# Patient Record
(demographics unavailable — no encounter records)

---

## 2025-01-30 NOTE — PHYSICAL EXAM
[General Appearance - Well Nourished] : well nourished [Sclera] : the sclera and conjunctiva were normal [Outer Ear] : the ears and nose were normal in appearance [Neck Appearance] : the appearance of the neck was normal [Auscultation Breath Sounds / Voice Sounds] : lungs were clear to auscultation bilaterally [Murmurs] : no murmurs [FreeTextEntry1] : tachycardia [Edema] : there was no peripheral edema [Abdomen Soft] : soft [Cervical Lymph Nodes Enlarged Posterior Bilaterally] : posterior cervical [Cervical Lymph Nodes Enlarged Anterior Bilaterally] : anterior cervical [Supraclavicular Lymph Nodes Enlarged Bilaterally] : supraclavicular [Musculoskeletal - Swelling] : no joint swelling seen [Skin Color & Pigmentation] : normal skin color and pigmentation [Skin Turgor] : normal skin turgor [] : no rash [Sensation] : the sensory exam was normal to light touch and pinprick [Motor Exam] : the motor exam was normal [Oriented To Time, Place, And Person] : oriented to person, place, and time [Impaired Insight] : insight and judgment were intact [Affect] : the affect was normal

## 2025-01-30 NOTE — HISTORY OF PRESENT ILLNESS
[FreeTextEntry1] : 1. SLE/LN   Pt was diagnosed with SLE 2018. She likely had PRES (symptoms of seizures, confusion, HTN) at the time. Renal failure required HD. A renal bx done 5/2018 showed class V LN with moderate interstitial fibrosis and tubular atrophy. Serologies at the time were notable for: BRIDGETTE 1:1280, positive RNP and Miller, negative Sjogren's and dsDNA.  She was previously treated with Cellcept, but discontinued due to leukopenia and diarrhea. Received Rituxan 6/2018 and 7/2018. 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 Serum phospholipase A2 receptor Ab was negative. Pt received a kidney transplant in 2019; however, pt showed signs of rejection 5/2020 with high donor specific antibodies-5/2020 renal biopsy showed acute cellular tubulointerstitial allograft rejection. She received PLEX and bortezomib, and repeat bx 11/2020 showed global glomerulosclerosis; however, it was a limited sample with only 6 glomeruli. In 2023, pt officially had failed transplant due to Covid. She started HD and then in 2024 switched to PD 2. Avascular necrosis: right knee AVN on MRI 2020  3. DVT and PE: Pt had DVT R leg 5/2018 and took Lovenox full dose for 3 months then prophylactic dose for 1 year.  In 2022, pt also had L lower leg DVT and b/l PE, requiring thrombectomy and TPA. APS serology was negative, but she had high factor 8 levels. She has been on Eliquis since 2022. 4. Peripheral neuropathy:  she was hospitalized in early 2025 for bilateral foot pain, which was felt to be from a sensory neuropathy. With gabapentin, the symptoms improved.  5. Hypokalemia  Meds: Envarsus 6 mg/d famotidine 20 mg/d prednisone 7.5 mg/d KCl 40 mEq 3xd folate B12 gabapentin 100 mg/d Eliquis 5 mg 2xd  Vaccines entered in EMR

## 2025-01-30 NOTE — ASSESSMENT
[FreeTextEntry1] : 1. SLE/LN   Pt was diagnosed with SLE 2018. She likely had PRES (symptoms of seizures, confusion, HTN) at the time. Renal failure required HD. A renal bx done 5/2018 showed class V LN with moderate interstitial fibrosis and tubular atrophy. Serologies at the time were notable for: BRIDGETTE 1:1280, positive RNP and Miller, negative Sjogren's and dsDNA.  She was previously treated with Cellcept, but discontinued due to leukopenia and diarrhea. Received Rituxan 6/2018 and 7/2018. 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 Serum phospholipase A2 receptor Ab was negative. Pt received a kidney transplant in 2019; however, pt showed signs of rejection 5/2020 with high donor specific antibodies-5/2020 renal biopsy showed acute cellular tubulointerstitial allograft rejection. She received PLEX and bortezomib, and repeat bx 11/2020 showed global glomerulosclerosis; however, it was a limited sample with only 6 glomeruli. In 2023, pt officially had failed transplant due to Covid. She started HD and then in 2024 switched to PD 2. Avascular necrosis: right knee AVN on MRI 2020  3. DVT and PE: Pt had DVT R leg 5/2018 and took Lovenox full dose for 3 months then prophylactic dose for 1 year.  In 2022, pt also had L lower leg DVT and b/l PE, requiring thrombectomy and TPA. APS serology was negative, but she had high factor 8 levels. She has been on Eliquis since 2022. 4. Peripheral neuropathy:  she was hospitalized in early 2025 for bilateral foot pain, which was felt to be from a sensory neuropathy. With gabapentin, the symptoms improved.  5. Hypokalemia  Plan Lab tests Reviewed internal and/or external records of other providers Contact me Systemic Lupus Erythematosus, known as lupus, is a chronic autoimmune disease that can affect any organ in the body posing threats to proper organ function and even to life. Therefore, close surveillance of all bodily functions is required, including but not limited to central and peripheral nervous system, ocular and auditory systems, cardiopulmonary function, kidney function, mucocutaneous and musculoskeletal systems as well as constitutional manifestations. Surveillance consists of history, physical, and laboratory tests. Treatment varies, but most of the drugs used are high risk and therefore also require close monitoring in the form of blood and urine tests. High risk medications used in the treatment of rheumatic diseases include steroids, disease modifying agents, immunosuppressive therapies, antimalarials, biologics, and chemotherapy. Regardless of which drug or class of drug, the potential toxicities of these therapies mandate close monitoring in the form of a history, physical, and laboratory tests. Return 1-2 months pending lab results

## 2025-02-13 NOTE — PHYSICAL EXAM
[General Appearance - Alert] : alert [General Appearance - In No Acute Distress] : in no acute distress [General Appearance - Well Nourished] : well nourished [General Appearance - Well Developed] : well developed [General Appearance - Well-Appearing] : healthy appearing [Sclera] : the sclera and conjunctiva were normal [PERRL With Normal Accommodation] : pupils were equal in size, round, and reactive to light [Oropharynx] : the oropharynx was normal [Neck Appearance] : the appearance of the neck was normal [Neck Cervical Mass (___cm)] : no neck mass was observed [Jugular Venous Distention Increased] : there was no jugular-venous distention [Respiration, Rhythm And Depth] : normal respiratory rhythm and effort [Exaggerated Use Of Accessory Muscles For Inspiration] : no accessory muscle use [Auscultation Breath Sounds / Voice Sounds] : lungs were clear to auscultation bilaterally [Murmurs] : no murmurs [Heart Sounds Pericardial Friction Rub] : no pericardial rub [Edema] : there was no peripheral edema [Bowel Sounds] : normal bowel sounds [Abdomen Soft] : soft [FreeTextEntry1] : RLQ incision, renal allograft non tender [Abnormal Walk] : normal gait [Musculoskeletal - Swelling] : no joint swelling seen [Dialysis Catheter] : dialysis catheter [] : right dorsalis pedis palpable [No Focal Deficits] : no focal deficits [Oriented To Time, Place, And Person] : oriented to person, place, and time

## 2025-02-13 NOTE — HISTORY OF PRESENT ILLNESS
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 She has a failed kidney transplant and is back on HD since October 2023.  She presents for an annual f/u for transplant evaluation.   PMH:  Lupus nephritis (Class V)  was diagnosed at age 15 after she presented in renal failure in 2018 unresponsive to rx with MMF and Rituximab.  She on HD for 1 year then received DDRT 3/29/2019. Immunosuppression was tac/mmf/prednisone. 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 She was started on Sirolimus but course complicated by rejection. Biopsy 5/2020 showed ACR 1A and [facility] with high class II DSA against DQ (18K). She was put back on Envarsus, treated with plex/ivig, bortezomib. Rebiopsied October 2020 showing persisted AMR rx with steroids and IVIG then started Shelbie in addition to Envarsus, Myfortic and prednisone.  She transitioned from pediatric to adult nephrology in in April 2023.  Hospitalized in October 2023 with renal failure and severe anemia and metabolic acidosis. Creatinine was up from 1.6 in 8/2023 to 14 in October 2023.  Transplant kidney biopsy done 10/27/23 showed [place] 2A TCMR and chronic active ABMR. She was treated with Plex/IVIG/Thymo with no recovery of graft function. She was started on HD. Also COVID +ve at the time treated with Remdesivir.   Shelbie last dose was in September 2023, Cellcept Stopped in October 2023. Remains on Envarsus 8 and Prednisone 5.   She currently receives hemodialysis twice a week on Monday and Friday,  via right IJ permcath. She is adherent to dialysis treatment. Nephrologist Dr. Coyne at [facility].  No intradialytic hypotension or other dialysis related complications. Ultrafiltration 1 - 1.5  liter each session.  Has residual graft function voids 5 times a day ~ 1 cup each time.  Readmitted last month 1/2024 for abdominal pain - found to have ruptured ovarian cyst +/- ? transplant pyelonephritis. Treated with zosyn x 5 days, urine culture grew staph epi.     PMH SLE diagnosed in 2018. Only had renal failure, no systemic symptoms.  HTN dx 2018 at the time of renal failure dx.   PRES seizure in 2018 - 1 episode  Duodenal ulcer in the setting of high dose steroids 2018  Post transplant Diabetes was diagnosed in 2020. She has been on insulin, now off all agents.  DVT ; h/o RLE DVT in 2018.  She presented with L DVT, with left common femoral vein in 11/2022. Thrombosis extending to the infrarenal inferior vena cava. Started on anticoagulation. She underwent a mechanical thrombectomy with near complete resolution but some residual clot. Subsequently, found to have b/l PEs. Respiratory status improved with systemic anticoagulation, no further surgical intervention.  Abnormal PAP smear dx recently - colposcopy planned. Ovarian cyst rupture 1/2024  UTIs x 3 in the past year , treated with oral antibiotics x2, one in the hospital recently.   PSH:  Kidney transplant 2019  Thrombectomy  Permcath  Kidney biopsy 3x (transplant) 1 (native)   No history of coronary artery disease. No known history of peripheral vascular disease, no claudication  No history of bleeding problems  No history of Stroke No history of lung problems including COPD, Asthma, Sleep apnea, pneumonia, bronchitis.  No history of kidney stones No history of cancer.  No history of viral infections such as hepatitis or HIV, no history of tuberculosis  No pregnancies   Sensitizing events: - prior transplant, also had blood transfusions - most recently 1/2024 Exercise tolerance  -  able to walk several blocks, climbs stairs, no sob or chest pain  Potential live donors -  Fiance  Family History:-  Father;   62 years old, HTN, colon cancer  Mother : 60s ,  SLE  Siblings : 3 siblings, healthy   Social history:- Lives with parents. Works full time as a .  No smoking, no alcohol, no drugs, no marijuana  Engaged   IS medications.  Envarsus 8 Prednisone 5  Amlodipine 5mg  daily  Eliquis 5mg bid  Famotidine 20  MVI, Tylenol, melatonin OTC

## 2025-02-13 NOTE — PLAN
[FreeTextEntry1] : Cause of renal failure is Lupus nephritis (Class V) was diagnosed at age 15 after she presented in renal failure in 2018 unresponsive to rx with MMF and Ritux. Listed status 1  Follows with Dr. Coyne Last Seen 2/2024 Listed 8/2024 Dialysis 1/2024 PD ABO A+ will check titer today % will repeat today  Most recent testing Cardiac - Dr. Clay Echocardiogram 3/27/2024- demonstrates normal left ventricular size, with mildly reduced function, EF 46% which appears global, normal right ventricular size with borderline reduced function and normal pulmonary pressure. There is no valvular pathology.  Treadmill exercise stress 3/27/2024- demonstrates fair exertional capacity limited by leg fatigue. EKG response was normal to level of exertion achieved.  Radiology CT chest, abdomen and pelvis 11/11/2024- Native kidneys are atrophic and enhance, right pelvic kidney atrophic and does not enhance. Mild ascites, peritoneal dialysis catheter in place  Cancer Screening Mammo- N/A Colonoscopy N/A  Specialist- ID- recurrent UTIs and bacteremia 8/12/2024- TB-spot negative  Specialist- RHEUM- SLE High risk medications used in the treatment of rheumatic diseases include steroids, disease modifying agents, immunosuppressive therapies, antimalarials, biologics, and chemotherapy. Regardless of which drug or class of drug, the potential toxicities of these therapies mandate close monitoring in the form of a history, physical, and laboratory tests. Return 1-2 months pending lab results.  Specialist- HEMEONC- Hypercoagulable state, hx Right leg DVT 2018- Eliquis Q12 hrs. F/u in 6 months.  Specialist- IR- Requesting venography to assess the best location for placing a second kidney transplant. 6/11/24 CT reviewed- vessels appear patent DVTs/PEs- hx of left sided DVT in November of 2022 that included the left common femoral vein and extended to the infrarenal vena cava s/p mechanical thrombectomy and anti-coagulation - also hx of PE in 2022 s/p local TPA - managed by Dr. Benítez - maintained on Eliquis - if proceeding with pelvic venogram/IVC venogram will need to discuss holding Eliquis x 24 hrs preop with Dr. Benítez **PENDING VENOGRAM**

## 2025-02-13 NOTE — REVIEW OF SYSTEMS
[Fever] : no fever [Chills] : no chills [Fatigue] : no fatigue [Recent Weight Gain (___ Lbs)] : no recent weight gain [Recent Weight Loss (___ Lbs)] : no recent weight loss [Wears glasses] : wears glasses [Chest Pain] : no chest pain [Palpitations] : no palpitations [SOB] : no shortness of breath [Wheezing] : no wheezing [Cough] : no cough [Abdominal Pain] : no abdominal pain [Nausea] : no nausea [Constipation] : no constipation [Diarrhea] : diarrhea [Vomiting] : no vomiting [Dysuria] : no dysuria [Frequency] : no frequency [Hematuria] : no hematuria [Regular Menstrual Periods] : regular menstrual periods [Joint Pain] : no joint pain [Skin Rash] : no skin rash [Suicidal] : not suicidal [Anxiety] : no anxiety [Depression] : no depression [Anemia] : anemia [de-identified] : Iron during dialysis

## 2025-04-15 NOTE — ASSESSMENT
[FreeTextEntry1] : 1. SLE/LN   Pt was diagnosed with SLE 2018. She likely had PRES (symptoms of seizures, confusion, HTN) at the time. Renal failure required HD. A renal bx done 5/2018 showed class V LN with moderate interstitial fibrosis and tubular atrophy. Serologies at the time were notable for: BRIDGETTE 1:1280, positive RNP and Miller, negative Sjogren's and dsDNA.  She was previously treated with Cellcept, but discontinued due to leukopenia and diarrhea. Received Rituxan 6/2018 and 7/2018. 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 Serum phospholipase A2 receptor Ab was negative. Pt received a kidney transplant in 2019; however, pt showed signs of rejection 5/2020 with high donor specific antibodies-5/2020 renal biopsy showed acute cellular tubulointerstitial allograft rejection. She received PLEX and bortezomib, and repeat bx 11/2020 showed global glomerulosclerosis; however, it was a limited sample with only 6 glomeruli. In 2023, pt officially had failed transplant due to Covid. She started HD and then in 2024 switched to PD.  In 2025 her SLE appeared to be clinically and serologically inactive.  I suggested that she ask her other doctors in the prednisone could be reduced to 5 mg/d.  2. Avascular necrosis: right knee AVN on MRI 2020  3. DVT and PE: Pt had DVT R leg 5/2018 and took Lovenox full dose for 3 months then prophylactic dose for 1 year.  In 2022, pt also had L lower leg DVT and b/l PE, requiring thrombectomy and TPA. APS serology was negative, but she had high factor 8 levels. She has been on Eliquis since 2022. 4. Peripheral neuropathy:  she was hospitalized in early 2025 for bilateral foot pain, which was felt to be from a sensory neuropathy. With gabapentin, the symptoms improved.  5. Hypokalemia  Plan Lab tests reviewed Reviewed internal and/or external records of other providers Contact me Systemic Lupus Erythematosus, known as lupus, is a chronic autoimmune disease that can affect any organ in the body posing threats to proper organ function and even to life. Therefore, close surveillance of all bodily functions is required, including but not limited to central and peripheral nervous system, ocular and auditory systems, cardiopulmonary function, kidney function, mucocutaneous and musculoskeletal systems as well as constitutional manifestations. Surveillance consists of history, physical, and laboratory tests. Treatment varies, but most of the drugs used are high risk and therefore also require close monitoring in the form of blood and urine tests. High risk medications used in the treatment of rheumatic diseases include steroids, disease modifying agents, immunosuppressive therapies, antimalarials, biologics, and chemotherapy. Regardless of which drug or class of drug, the potential toxicities of these therapies mandate close monitoring in the form of a history, physical, and laboratory tests. Return 3-6 months pending status of lupus

## 2025-04-15 NOTE — HISTORY OF PRESENT ILLNESS
[FreeTextEntry1] : 1. SLE/LN   Pt was diagnosed with SLE 2018. She likely had PRES (symptoms of seizures, confusion, HTN) at the time. Renal failure required HD. A renal bx done 5/2018 showed class V LN with moderate interstitial fibrosis and tubular atrophy. Serologies at the time were notable for: BRIDGETTE 1:1280, positive RNP and Miller, negative Sjogren's and dsDNA.  She was previously treated with Cellcept, but discontinued due to leukopenia and diarrhea. Received Rituxan 6/2018 and 7/2018. 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 Serum phospholipase A2 receptor Ab was negative. Pt received a kidney transplant in 2019; however, pt showed signs of rejection 5/2020 with high donor specific antibodies-5/2020 renal biopsy showed acute cellular tubulointerstitial allograft rejection. She received PLEX and bortezomib, and repeat bx 11/2020 showed global glomerulosclerosis; however, it was a limited sample with only 6 glomeruli. In 2023, pt officially had failed transplant due to Covid. She started HD and then in 2024 switched to PD.  In 2025 her SLE appeared to be clinically and serologically inactive.  I suggested that she ask her other doctors in the prednisone could be reduced to 5 mg/d.  2. Avascular necrosis: right knee AVN on MRI 2020  3. DVT and PE: Pt had DVT R leg 5/2018 and took Lovenox full dose for 3 months then prophylactic dose for 1 year.  In 2022, pt also had L lower leg DVT and b/l PE, requiring thrombectomy and TPA. APS serology was negative, but she had high factor 8 levels. She has been on Eliquis since 2022. 4. Peripheral neuropathy:  she was hospitalized in early 2025 for bilateral foot pain, which was felt to be from a sensory neuropathy. With gabapentin, the symptoms improved.  5. Hypokalemia  Meds: Envarsus 6 mg/d famotidine 20 mg/d prednisone 7.5 mg/d KCl 40 mEq 3xd folate B12 gabapentin 100 mg/d Eliquis 5 mg 2xd  Vaccines entered in EMR

## 2025-04-25 NOTE — HISTORY OF PRESENT ILLNESS
[0 - No Distress] : Distress Level: 0 [90: Able to carry normal activity; minor signs or symptoms of disease.] : 90: Able to carry normal activity; minor signs or symptoms of disease.  [de-identified] : Patient is a 21 y/o F with PHMx: HTN, duodenal ulcer, ESRD 2/2 SLE nephritis s/p renal transplant in 2019, previous R sided DVT in 2018 (treated with AC x 1 year),and DM presented to the ED with left leg pain started approximately  9 days prior to admission.  She does have a rheumatologist but was never worked up for APLS. Denies smoking cigarette, use of OCP or recent long distance travel.   During this recent  admission, she was found to have extensive DVT. IR performed thrombectomy of the L femoral DVT on 11/18. On 11/20/22, the patient became hypoxic and an RRT was called. CTA performed showed b/l PE with b/l pulmonary infarction. TTE revealed mild septal flattening concerning for RV overload and adult Cardiology team was consulted prompting transfer to [facility] for further management and thrombectomy evaluation. She is given local tPA to the pulmonary thrombus.   Patient was  not on any home AC ; she stopped lovenox a few months after she was diagnosed with Rt leg DVT 4 yrs ago  - CT abdomen and pelvis with cont:  Venous thrombus in the left internal iliac vein, a portion of the left  external iliac vein and the left common iliac vein. Thrombus does not extend into the IVC. Bilateral pulmonary embolism at the lung bases with near complete consolidation of the left lower lobe and patchy opacities in the visualized left upper lobe. - Lupus anticoagulant test negative. B 2 G was negative as well as anticardiolipin antibody negative.   Patient was discharged on Lovenox 100 mg q 12 hrs, tolerated well.     [de-identified] : Patient is on Eliquis 5 mg q 12 hrs, tolerated well. Denies bleeding.  Kidney transplant failed October 2023, and has been on HD since. She is on the transplant list. Patient has been on retacrit  ( unclear dose), self injecting.  2/13/25 Hb 9.1 g/dl, Hct 30.3.   Denies new blood clots.  No other changes in medical, surgical or social history since 5/23/24

## 2025-04-25 NOTE — ASSESSMENT
[FreeTextEntry1] : Patient is a 22 y/o F with PHMx: HTN, duodenal ulcer, ESRD 2/2 SLE nephritis s/p renal transplant in 2019, previous R sided DVT in 2018 (treated with AC x 1 year),and DM presented to the ED with left leg pain.   She was found to have extensive DVT. IR performed thrombectomy of the L femoral DVT on 11/18. On 11/20/22, the patient became hypoxic and an RRT was called. CTA performed showed b/l PE with b/l pulmonary infarction. TTE revealed mild septal flattening concerning for RV overload and adult Cardiology team was consulted prompting transfer to [facility] for further management and thrombectomy evaluation. She is given local tPA to the pulmonary thrombus. Patient now has kidney rejection since Oct 2023, and is on HD. She has a procedure to install peritoneal catheter to initiate peritoneal HD.   - Lupus anticoagulant test negative. B 2 G was negative as well as anticardiolipin antibody negative.  Factor V leiden, and  prothrombin gene mutation negative.   Patient is on Eliquis 5 mg q 12 hrs.   I recommended anticoagulation for life.   Anemia of Chronic disease:  Patient has been on retacrit  ( unclear dose), self-injecting.  2/13/25 Hb 9.1 g/dl, Hct 30.3.  Will do anemia work up to determine if patient needs iron replacement therapy.  Patient has been of folic acid 1 mg daily.  Will call patient with results.    RTC prn.

## 2025-04-25 NOTE — REASON FOR VISIT
[Follow-Up Visit] : a follow-up visit for [Parent] : parent [FreeTextEntry2] : Hypercoagulable state